# Patient Record
Sex: MALE | Race: WHITE | Employment: UNEMPLOYED | URBAN - METROPOLITAN AREA
[De-identification: names, ages, dates, MRNs, and addresses within clinical notes are randomized per-mention and may not be internally consistent; named-entity substitution may affect disease eponyms.]

---
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
## 2019-03-20 ENCOUNTER — TELEPHONE (OUTPATIENT)
Dept: PEDIATRIC ENDOCRINOLOGY | Age: 19
End: 2019-03-20

## 2019-03-20 NOTE — TELEPHONE ENCOUNTER
----- Message from Bayard Epley sent at 3/20/2019 10:05 AM EDT -----  Regarding: Providers  Contact: [phone]  Shant Enciso called from [phone] [facility] for Christy Downing NP needs all of last labs please fax [phone] attention [place]. Please advise Shant Enciso [phone].